# Patient Record
Sex: MALE | Race: WHITE | URBAN - METROPOLITAN AREA
[De-identification: names, ages, dates, MRNs, and addresses within clinical notes are randomized per-mention and may not be internally consistent; named-entity substitution may affect disease eponyms.]

---

## 2017-03-01 ENCOUNTER — HISTORICAL (OUTPATIENT)
Dept: ADMINISTRATIVE | Facility: HOSPITAL | Age: 57
End: 2017-03-01

## 2017-03-09 ENCOUNTER — HISTORICAL (OUTPATIENT)
Dept: ADMINISTRATIVE | Facility: HOSPITAL | Age: 57
End: 2017-03-09

## 2017-03-22 ENCOUNTER — HISTORICAL (OUTPATIENT)
Dept: ADMINISTRATIVE | Facility: HOSPITAL | Age: 57
End: 2017-03-22

## 2017-05-23 ENCOUNTER — HISTORICAL (OUTPATIENT)
Dept: ADMINISTRATIVE | Facility: HOSPITAL | Age: 57
End: 2017-05-23

## 2017-05-23 LAB
ABS NEUT (OLG): 2.92 X10(3)/MCL (ref 2.1–9.2)
ALBUMIN SERPL-MCNC: 3.6 GM/DL (ref 3.4–5)
ALBUMIN/GLOB SERPL: 1 {RATIO}
ALP SERPL-CCNC: 159 UNIT/L (ref 20–120)
ALT SERPL-CCNC: 33 UNIT/L
AST SERPL-CCNC: 45 UNIT/L
BASOPHILS # BLD AUTO: 0.02 X10(3)/MCL
BASOPHILS NFR BLD AUTO: 0 % (ref 0–1)
BILIRUB SERPL-MCNC: 1 MG/DL
BILIRUBIN DIRECT+TOT PNL SERPL-MCNC: 0.2 MG/DL
BILIRUBIN DIRECT+TOT PNL SERPL-MCNC: 0.8 MG/DL
BUN SERPL-MCNC: 13 MG/DL (ref 7–25)
CALCIUM SERPL-MCNC: 9.3 MG/DL (ref 8.4–10.3)
CHLORIDE SERPL-SCNC: 103 MMOL/L (ref 96–110)
CO2 SERPL-SCNC: 28 MMOL/L (ref 24–32)
CREAT SERPL-MCNC: 0.86 MG/DL (ref 0.7–1.4)
EOSINOPHIL # BLD AUTO: 0.06 10*3/UL
EOSINOPHIL NFR BLD AUTO: 2 % (ref 0–5)
ERYTHROCYTE [DISTWIDTH] IN BLOOD BY AUTOMATED COUNT: 13.9 % (ref 11.5–14.5)
GLOBULIN SER-MCNC: 4.1 GM/ML (ref 2.3–3.5)
GLUCOSE SERPL-MCNC: 81 MG/DL (ref 65–99)
HCT VFR BLD AUTO: 35.5 % (ref 40–51)
HGB BLD-MCNC: 12.1 GM/DL (ref 13.5–17.5)
IMM GRANULOCYTES # BLD AUTO: 0.01 10*3/UL
IMM GRANULOCYTES NFR BLD AUTO: 0 %
LYMPHOCYTES # BLD AUTO: 0.31 X10(3)/MCL
LYMPHOCYTES NFR BLD AUTO: 8 % (ref 15–40)
MCH RBC QN AUTO: 32.6 PG (ref 26–34)
MCHC RBC AUTO-ENTMCNC: 34.1 GM/DL (ref 31–37)
MCV RBC AUTO: 95.7 FL (ref 80–100)
MONOCYTES # BLD AUTO: 0.54 X10(3)/MCL
MONOCYTES NFR BLD AUTO: 14 % (ref 4–12)
NEUTROPHILS # BLD AUTO: 2.92 X10(3)/MCL
NEUTROPHILS NFR BLD AUTO: 76 X10(3)/MCL
PLATELET # BLD AUTO: 48 X10(3)/MCL (ref 130–400)
PMV BLD AUTO: 10.2 FL (ref 7.4–10.4)
POTASSIUM SERPL-SCNC: 4.3 MMOL/L (ref 3.6–5.2)
PROT SERPL-MCNC: 7.7 GM/DL (ref 6–8)
RBC # BLD AUTO: 3.71 X10(6)/MCL (ref 4.5–5.9)
SODIUM SERPL-SCNC: 136 MMOL/L (ref 135–146)
WBC # SPEC AUTO: 3.9 X10(3)/MCL (ref 4.5–11)

## 2017-05-30 ENCOUNTER — HISTORICAL (OUTPATIENT)
Dept: ADMINISTRATIVE | Facility: HOSPITAL | Age: 57
End: 2017-05-30

## 2017-05-30 LAB
ABS NEUT (OLG): 2.81 X10(3)/MCL (ref 2.1–9.2)
ALBUMIN SERPL-MCNC: 3.4 GM/DL (ref 3.4–5)
ALBUMIN/GLOB SERPL: 1 {RATIO}
ALP SERPL-CCNC: 158 UNIT/L (ref 20–120)
ALT SERPL-CCNC: 29 UNIT/L
AST SERPL-CCNC: 32 UNIT/L
BASOPHILS # BLD AUTO: 0.02 X10(3)/MCL
BASOPHILS NFR BLD AUTO: 0 % (ref 0–1)
BILIRUB SERPL-MCNC: 0.8 MG/DL
BILIRUBIN DIRECT+TOT PNL SERPL-MCNC: 0.1 MG/DL
BILIRUBIN DIRECT+TOT PNL SERPL-MCNC: 0.7 MG/DL
BUN SERPL-MCNC: 16 MG/DL (ref 7–25)
CALCIUM SERPL-MCNC: 9 MG/DL (ref 8.4–10.3)
CHLORIDE SERPL-SCNC: 104 MMOL/L (ref 96–110)
CO2 SERPL-SCNC: 27 MMOL/L (ref 24–32)
CREAT SERPL-MCNC: 0.87 MG/DL (ref 0.7–1.4)
EOSINOPHIL # BLD AUTO: 0.07 X10(3)/MCL
EOSINOPHIL NFR BLD AUTO: 2 % (ref 0–5)
ERYTHROCYTE [DISTWIDTH] IN BLOOD BY AUTOMATED COUNT: 13.8 % (ref 11.5–14.5)
GLOBULIN SER-MCNC: 4.3 GM/ML (ref 2.3–3.5)
GLUCOSE SERPL-MCNC: 140 MG/DL (ref 65–99)
HCT VFR BLD AUTO: 34.9 % (ref 40–51)
HGB BLD-MCNC: 11.7 GM/DL (ref 13.5–17.5)
IMM GRANULOCYTES # BLD AUTO: 0.01 10*3/UL
IMM GRANULOCYTES NFR BLD AUTO: 0 %
LYMPHOCYTES # BLD AUTO: 0.24 X10(3)/MCL
LYMPHOCYTES NFR BLD AUTO: 6 % (ref 15–40)
MCH RBC QN AUTO: 31.7 PG (ref 26–34)
MCHC RBC AUTO-ENTMCNC: 33.5 GM/DL (ref 31–37)
MCV RBC AUTO: 94.6 FL (ref 80–100)
MONOCYTES # BLD AUTO: 0.52 X10(3)/MCL
MONOCYTES NFR BLD AUTO: 14 % (ref 4–12)
NEUTROPHILS # BLD AUTO: 2.81 X10(3)/MCL
NEUTROPHILS NFR BLD AUTO: 77 X10(3)/MCL
PLATELET # BLD AUTO: 55 X10(3)/MCL (ref 130–400)
PMV BLD AUTO: 10.5 FL (ref 7.4–10.4)
POTASSIUM SERPL-SCNC: 4.3 MMOL/L (ref 3.6–5.2)
PROT SERPL-MCNC: 7.7 GM/DL (ref 6–8)
RBC # BLD AUTO: 3.69 X10(6)/MCL (ref 4.5–5.9)
SODIUM SERPL-SCNC: 135 MMOL/L (ref 135–146)
WBC # SPEC AUTO: 3.7 X10(3)/MCL (ref 4.5–11)

## 2017-06-06 ENCOUNTER — HISTORICAL (OUTPATIENT)
Dept: ADMINISTRATIVE | Facility: HOSPITAL | Age: 57
End: 2017-06-06

## 2017-06-06 LAB
ABS NEUT (OLG): 2.05 X10(3)/MCL (ref 2.1–9.2)
ALBUMIN SERPL-MCNC: 3.2 GM/DL (ref 3.4–5)
ALBUMIN/GLOB SERPL: 1 {RATIO}
ALP SERPL-CCNC: 163 UNIT/L (ref 20–120)
ALT SERPL-CCNC: 39 UNIT/L
AST SERPL-CCNC: 61 UNIT/L
BASOPHILS # BLD AUTO: 0.01 X10(3)/MCL
BASOPHILS NFR BLD AUTO: 0 % (ref 0–1)
BILIRUB SERPL-MCNC: 0.6 MG/DL
BILIRUBIN DIRECT+TOT PNL SERPL-MCNC: 0.1 MG/DL
BILIRUBIN DIRECT+TOT PNL SERPL-MCNC: 0.5 MG/DL
BUN SERPL-MCNC: 19 MG/DL (ref 7–25)
CALCIUM SERPL-MCNC: 9.1 MG/DL (ref 8.4–10.3)
CHLORIDE SERPL-SCNC: 104 MMOL/L (ref 96–110)
CO2 SERPL-SCNC: 27 MMOL/L (ref 24–32)
CREAT SERPL-MCNC: 1 MG/DL (ref 0.7–1.4)
EOSINOPHIL # BLD AUTO: 0.06 10*3/UL
EOSINOPHIL NFR BLD AUTO: 2 % (ref 0–5)
ERYTHROCYTE [DISTWIDTH] IN BLOOD BY AUTOMATED COUNT: 13.6 % (ref 11.5–14.5)
GLOBULIN SER-MCNC: 4.4 GM/ML (ref 2.3–3.5)
GLUCOSE SERPL-MCNC: 162 MG/DL (ref 65–99)
HCT VFR BLD AUTO: 34.3 % (ref 40–51)
HGB BLD-MCNC: 11.6 GM/DL (ref 13.5–17.5)
IMM GRANULOCYTES # BLD AUTO: 0.01 10*3/UL
IMM GRANULOCYTES NFR BLD AUTO: 0 %
LYMPHOCYTES # BLD AUTO: 0.18 X10(3)/MCL
LYMPHOCYTES NFR BLD AUTO: 7 % (ref 15–40)
MCH RBC QN AUTO: 32.1 PG (ref 26–34)
MCHC RBC AUTO-ENTMCNC: 33.8 GM/DL (ref 31–37)
MCV RBC AUTO: 95 FL (ref 80–100)
MONOCYTES # BLD AUTO: 0.32 X10(3)/MCL
MONOCYTES NFR BLD AUTO: 12 % (ref 4–12)
NEUTROPHILS # BLD AUTO: 2.05 X10(3)/MCL
NEUTROPHILS NFR BLD AUTO: 78 X10(3)/MCL
PLATELET # BLD AUTO: 53 X10(3)/MCL (ref 130–400)
PMV BLD AUTO: 10.6 FL (ref 7.4–10.4)
POTASSIUM SERPL-SCNC: 4.2 MMOL/L (ref 3.6–5.2)
PROT SERPL-MCNC: 7.6 GM/DL (ref 6–8)
RBC # BLD AUTO: 3.61 X10(6)/MCL (ref 4.5–5.9)
SODIUM SERPL-SCNC: 135 MMOL/L (ref 135–146)
WBC # SPEC AUTO: 2.6 X10(3)/MCL (ref 4.5–11)

## 2017-06-13 ENCOUNTER — HISTORICAL (OUTPATIENT)
Dept: ADMINISTRATIVE | Facility: HOSPITAL | Age: 57
End: 2017-06-13

## 2017-06-13 LAB
ABS NEUT (OLG): 1.64 X10(3)/MCL (ref 2.1–9.2)
ALBUMIN SERPL-MCNC: 3.2 GM/DL (ref 3.4–5)
ALBUMIN/GLOB SERPL: 1 {RATIO}
ALP SERPL-CCNC: 191 UNIT/L (ref 20–120)
ALT SERPL-CCNC: 62 UNIT/L
AST SERPL-CCNC: 68 UNIT/L
BASOPHILS NFR BLD MANUAL: 0 %
BILIRUB SERPL-MCNC: 0.8 MG/DL
BILIRUBIN DIRECT+TOT PNL SERPL-MCNC: 0.2 MG/DL
BILIRUBIN DIRECT+TOT PNL SERPL-MCNC: 0.6 MG/DL
BUN SERPL-MCNC: 12 MG/DL (ref 7–25)
CALCIUM SERPL-MCNC: 8.9 MG/DL (ref 8.4–10.3)
CHLORIDE SERPL-SCNC: 104 MMOL/L (ref 96–110)
CO2 SERPL-SCNC: 26 MMOL/L (ref 24–32)
CREAT SERPL-MCNC: 0.73 MG/DL (ref 0.7–1.4)
EOSINOPHIL NFR BLD MANUAL: 0 %
ERYTHROCYTE [DISTWIDTH] IN BLOOD BY AUTOMATED COUNT: 13.9 % (ref 11.5–14.5)
GLOBULIN SER-MCNC: 3.9 GM/ML (ref 2.3–3.5)
GLUCOSE SERPL-MCNC: 196 MG/DL (ref 65–99)
GRANULOCYTES NFR BLD MANUAL: 72 %
HCT VFR BLD AUTO: 31.8 % (ref 40–51)
HGB BLD-MCNC: 10.8 GM/DL (ref 13.5–17.5)
LYMPHOCYTES NFR BLD MANUAL: 6 %
MCH RBC QN AUTO: 32.3 PG (ref 26–34)
MCHC RBC AUTO-ENTMCNC: 34 GM/DL (ref 31–37)
MCV RBC AUTO: 95.2 FL (ref 80–100)
MONOCYTES NFR BLD MANUAL: 12 %
NEUTS BAND NFR BLD MANUAL: 10 %
PLATELET # BLD AUTO: 55 X10(3)/MCL (ref 130–400)
PLATELET # BLD EST: NORMAL 10*3/UL
PMV BLD AUTO: 11.2 FL (ref 7.4–10.4)
POTASSIUM SERPL-SCNC: 4.2 MMOL/L (ref 3.6–5.2)
PROT SERPL-MCNC: 7.1 GM/DL (ref 6–8)
RBC # BLD AUTO: 3.34 X10(6)/MCL (ref 4.5–5.9)
RBC MORPH BLD: NORMAL
SODIUM SERPL-SCNC: 135 MMOL/L (ref 135–146)
WBC # SPEC AUTO: 2.2 X10(3)/MCL (ref 4.5–11)

## 2017-06-27 ENCOUNTER — HISTORICAL (OUTPATIENT)
Dept: ADMINISTRATIVE | Facility: HOSPITAL | Age: 57
End: 2017-06-27

## 2017-06-27 LAB
ABS NEUT (OLG): 1.62 X10(3)/MCL
ACANTHOCYTES (OLG): ABNORMAL
ALBUMIN SERPL-MCNC: 3.6 GM/DL (ref 3.4–5)
ALBUMIN/GLOB SERPL: 1 {RATIO}
ALP SERPL-CCNC: 200 UNIT/L (ref 20–120)
ALT SERPL-CCNC: 51 UNIT/L
ANISOCYTOSIS BLD QL SMEAR: ABNORMAL
AST SERPL-CCNC: 55 UNIT/L
BASOPHILS NFR BLD MANUAL: 0 %
BILIRUB SERPL-MCNC: 0.9 MG/DL
BILIRUBIN DIRECT+TOT PNL SERPL-MCNC: 0.2 MG/DL
BILIRUBIN DIRECT+TOT PNL SERPL-MCNC: 0.7 MG/DL
BUN SERPL-MCNC: 12 MG/DL (ref 7–25)
CALCIUM SERPL-MCNC: 9.2 MG/DL (ref 8.4–10.3)
CHLORIDE SERPL-SCNC: 104 MMOL/L (ref 96–110)
CO2 SERPL-SCNC: 27 MMOL/L (ref 24–32)
CREAT SERPL-MCNC: 0.7 MG/DL (ref 0.7–1.4)
EOSINOPHIL NFR BLD MANUAL: 0 %
ERYTHROCYTE [DISTWIDTH] IN BLOOD BY AUTOMATED COUNT: 14.9 % (ref 11.5–14.5)
GLOBULIN SER-MCNC: 4 GM/ML (ref 2.3–3.5)
GLUCOSE SERPL-MCNC: 108 MG/DL (ref 65–99)
GRANULOCYTES NFR BLD MANUAL: 73 %
HCT VFR BLD AUTO: 32.3 % (ref 40–51)
HGB BLD-MCNC: 10.7 GM/DL (ref 13.5–17.5)
HYPOCHROMIA BLD QL SMEAR: ABNORMAL
LYMPHOCYTES NFR BLD MANUAL: 1 %
LYMPHOCYTES NFR BLD MANUAL: 18 %
MACROCYTES BLD QL SMEAR: ABNORMAL
MCH RBC QN AUTO: 30.7 PG (ref 26–34)
MCHC RBC AUTO-ENTMCNC: 33.1 GM/DL (ref 31–37)
MCV RBC AUTO: 92.8 FL (ref 80–100)
MICROCYTES BLD QL SMEAR: ABNORMAL
MONOCYTES NFR BLD MANUAL: 3 %
NEUTS BAND NFR BLD MANUAL: 5 %
PLATELET # BLD AUTO: 66 X10(3)/MCL (ref 130–400)
PLATELET # BLD EST: ABNORMAL 10*3/UL
PMV BLD AUTO: 11 FL (ref 7.4–10.4)
POTASSIUM SERPL-SCNC: 4.3 MMOL/L (ref 3.6–5.2)
PROT SERPL-MCNC: 7.6 GM/DL (ref 6–8)
RBC # BLD AUTO: 3.48 X10(6)/MCL (ref 4.5–5.9)
RBC MORPH BLD: ABNORMAL
SODIUM SERPL-SCNC: 135 MMOL/L (ref 135–146)
TSH SERPL-ACNC: 0.85 MIU/L (ref 0.5–5)
WBC # SPEC AUTO: 2.5 X10(3)/MCL (ref 4.5–11)

## 2017-07-04 ENCOUNTER — HISTORICAL (OUTPATIENT)
Dept: ADMINISTRATIVE | Facility: HOSPITAL | Age: 57
End: 2017-07-04

## 2017-07-04 LAB
ABS NEUT (OLG): 2.92 X10(3)/MCL (ref 2.1–9.2)
ALBUMIN SERPL-MCNC: 3.1 GM/DL (ref 3.4–5)
ALBUMIN/GLOB SERPL: 1 RATIO (ref 1–2)
ALP SERPL-CCNC: 243 UNIT/L (ref 45–117)
ALT SERPL-CCNC: 61 UNIT/L (ref 12–78)
AST SERPL-CCNC: 61 UNIT/L (ref 15–37)
BASOPHILS NFR BLD MANUAL: 0 %
BILIRUB SERPL-MCNC: 0.8 MG/DL (ref 0.2–1)
BILIRUBIN DIRECT+TOT PNL SERPL-MCNC: 0.3 MG/DL
BILIRUBIN DIRECT+TOT PNL SERPL-MCNC: 0.5 MG/DL
BUN SERPL-MCNC: 14 MG/DL (ref 7–18)
CALCIUM SERPL-MCNC: 10 MG/DL (ref 8.5–10.1)
CHLORIDE SERPL-SCNC: 105 MMOL/L (ref 98–107)
CO2 SERPL-SCNC: 28 MMOL/L (ref 21–32)
CREAT SERPL-MCNC: 0.8 MG/DL (ref 0.6–1.3)
EOSINOPHIL NFR BLD MANUAL: 1 %
ERYTHROCYTE [DISTWIDTH] IN BLOOD BY AUTOMATED COUNT: 15 % (ref 11.5–14.5)
GLOBULIN SER-MCNC: 4.7 GM/ML (ref 2.3–3.5)
GLUCOSE SERPL-MCNC: 109 MG/DL (ref 74–106)
GRANULOCYTES NFR BLD MANUAL: 75 %
HCT VFR BLD AUTO: 32.7 % (ref 40–51)
HGB BLD-MCNC: 10.9 GM/DL (ref 13.5–17.5)
LYMPHOCYTES NFR BLD MANUAL: 8 %
MCH RBC QN AUTO: 30.4 PG (ref 26–34)
MCHC RBC AUTO-ENTMCNC: 33.3 GM/DL (ref 31–37)
MCV RBC AUTO: 91.3 FL (ref 80–100)
MONOCYTES NFR BLD MANUAL: 7 %
NEUTS BAND NFR BLD MANUAL: 9 %
PLATELET # BLD AUTO: 68 X10(3)/MCL (ref 130–400)
PLATELET # BLD EST: NORMAL 10*3/UL
PMV BLD AUTO: 10.6 FL (ref 7.4–10.4)
POTASSIUM SERPL-SCNC: 4.4 MMOL/L (ref 3.5–5.1)
PROT SERPL-MCNC: 7.8 GM/DL (ref 6.4–8.2)
RBC # BLD AUTO: 3.58 X10(6)/MCL (ref 4.5–5.9)
RBC MORPH BLD: NORMAL
SODIUM SERPL-SCNC: 139 MMOL/L (ref 136–145)
WBC # SPEC AUTO: 3.7 X10(3)/MCL (ref 4.5–11)

## 2017-07-11 ENCOUNTER — HISTORICAL (OUTPATIENT)
Dept: ADMINISTRATIVE | Facility: HOSPITAL | Age: 57
End: 2017-07-11

## 2017-07-11 LAB
ALBUMIN SERPL-MCNC: 3 GM/DL (ref 3.4–5)
ALBUMIN/GLOB SERPL: 1 RATIO (ref 1–2)
ALP SERPL-CCNC: 261 UNIT/L (ref 45–117)
ALT SERPL-CCNC: 58 UNIT/L (ref 12–78)
AST SERPL-CCNC: 64 UNIT/L (ref 15–37)
BILIRUB SERPL-MCNC: 0.6 MG/DL (ref 0.2–1)
BILIRUBIN DIRECT+TOT PNL SERPL-MCNC: 0.2 MG/DL
BILIRUBIN DIRECT+TOT PNL SERPL-MCNC: 0.4 MG/DL
BUN SERPL-MCNC: 13 MG/DL (ref 7–18)
CALCIUM SERPL-MCNC: 10 MG/DL (ref 8.5–10.1)
CHLORIDE SERPL-SCNC: 104 MMOL/L (ref 98–107)
CO2 SERPL-SCNC: 30 MMOL/L (ref 21–32)
CREAT SERPL-MCNC: 0.9 MG/DL (ref 0.6–1.3)
GLOBULIN SER-MCNC: 4.5 GM/ML (ref 2.3–3.5)
GLUCOSE SERPL-MCNC: 167 MG/DL (ref 74–106)
POTASSIUM SERPL-SCNC: 4.2 MMOL/L (ref 3.5–5.1)
PROT SERPL-MCNC: 7.5 GM/DL (ref 6.4–8.2)
SODIUM SERPL-SCNC: 140 MMOL/L (ref 136–145)

## 2017-07-12 LAB
ABS NEUT (OLG): 2.23 X10(3)/MCL (ref 2.1–9.2)
BASOPHILS # BLD AUTO: 0.02 X10(3)/MCL
BASOPHILS NFR BLD AUTO: 1 % (ref 0–1)
EOSINOPHIL # BLD AUTO: 0.04 X10(3)/MCL
EOSINOPHIL NFR BLD AUTO: 1 % (ref 0–5)
ERYTHROCYTE [DISTWIDTH] IN BLOOD BY AUTOMATED COUNT: 15.1 % (ref 11.5–14.5)
HCT VFR BLD AUTO: 31 % (ref 40–51)
HGB BLD-MCNC: 10.5 GM/DL (ref 13.5–17.5)
IMM GRANULOCYTES # BLD AUTO: 0.02 10*3/UL
IMM GRANULOCYTES NFR BLD AUTO: 1 %
LYMPHOCYTES # BLD AUTO: 0.21 X10(3)/MCL
LYMPHOCYTES NFR BLD AUTO: 7 % (ref 15–40)
MCH RBC QN AUTO: 30.6 PG (ref 26–34)
MCHC RBC AUTO-ENTMCNC: 33.9 GM/DL (ref 31–37)
MCV RBC AUTO: 90.4 FL (ref 80–100)
MONOCYTES # BLD AUTO: 0.4 X10(3)/MCL
MONOCYTES NFR BLD AUTO: 14 % (ref 4–12)
NEUTROPHILS # BLD AUTO: 2.23 X10(3)/MCL
NEUTROPHILS NFR BLD AUTO: 76 X10(3)/MCL
PLATELET # BLD AUTO: 76 X10(3)/MCL (ref 130–400)
PMV BLD AUTO: 12.1 FL (ref 7.4–10.4)
RBC # BLD AUTO: 3.43 X10(6)/MCL (ref 4.5–5.9)
WBC # SPEC AUTO: 2.9 X10(3)/MCL (ref 4.5–11)

## 2017-07-16 ENCOUNTER — HISTORICAL (OUTPATIENT)
Dept: ADMINISTRATIVE | Facility: HOSPITAL | Age: 57
End: 2017-07-16

## 2017-07-16 LAB
ALBUMIN SERPL-MCNC: 2.9 GM/DL (ref 3.4–5)
ALBUMIN/GLOB SERPL: 1 RATIO (ref 1–2)
ALP SERPL-CCNC: 228 UNIT/L (ref 45–117)
ALT SERPL-CCNC: 39 UNIT/L (ref 12–78)
ANISOCYTOSIS BLD QL SMEAR: ABNORMAL
APPEARANCE, UA: CLEAR
AST SERPL-CCNC: 34 UNIT/L (ref 15–37)
BACTERIA #/AREA URNS AUTO: ABNORMAL /[HPF]
BASOPHILS NFR BLD MANUAL: 0 %
BILIRUB SERPL-MCNC: 0.7 MG/DL (ref 0.2–1)
BILIRUB UR QL STRIP: NEGATIVE
BILIRUBIN DIRECT+TOT PNL SERPL-MCNC: 0.3 MG/DL
BILIRUBIN DIRECT+TOT PNL SERPL-MCNC: 0.4 MG/DL
BUN SERPL-MCNC: 12 MG/DL (ref 7–18)
CALCIUM SERPL-MCNC: 10 MG/DL (ref 8.5–10.1)
CHLORIDE SERPL-SCNC: 104 MMOL/L (ref 98–107)
CK MB SERPL-MCNC: 1.3 NG/ML (ref 1–3.6)
CK SERPL-CCNC: 36 UNIT/L (ref 39–308)
CO2 SERPL-SCNC: 26 MMOL/L (ref 21–32)
COLOR UR: ABNORMAL
CREAT SERPL-MCNC: 0.7 MG/DL (ref 0.6–1.3)
EOSINOPHIL NFR BLD MANUAL: 1 %
ERYTHROCYTE [DISTWIDTH] IN BLOOD BY AUTOMATED COUNT: 16 % (ref 11.5–14.5)
GLOBULIN SER-MCNC: 4.5 GM/ML (ref 2.3–3.5)
GLUCOSE (UA): NORMAL
GLUCOSE SERPL-MCNC: 94 MG/DL (ref 74–106)
GRANULOCYTES NFR BLD MANUAL: 68 % (ref 43–75)
HCT VFR BLD AUTO: 28.9 % (ref 40–51)
HGB BLD-MCNC: 9.6 GM/DL (ref 13.5–17.5)
HGB UR QL STRIP: NEGATIVE
HYALINE CASTS #/AREA URNS LPF: ABNORMAL /[LPF]
HYPOCHROMIA BLD QL SMEAR: ABNORMAL
KETONES UR QL STRIP: NEGATIVE
LEUKOCYTE ESTERASE UR QL STRIP: NEGATIVE
LYMPHOCYTES NFR BLD MANUAL: 5 % (ref 20.5–51.1)
MCH RBC QN AUTO: 30.6 PG (ref 26–34)
MCHC RBC AUTO-ENTMCNC: 33.2 GM/DL (ref 31–37)
MCV RBC AUTO: 92 FL (ref 80–100)
MONOCYTES NFR BLD MANUAL: 5 % (ref 2–9)
MYELOCYTES NFR BLD MANUAL: 1 %
NEUTS BAND NFR BLD MANUAL: 20 % (ref 0–10)
NITRITE UR QL STRIP: NEGATIVE
PH UR STRIP: 6 [PH] (ref 4.5–8)
PLATELET # BLD AUTO: 64 X10(3)/MCL (ref 130–400)
PLATELET # BLD EST: ABNORMAL 10*3/UL
PMV BLD AUTO: 11.7 FL (ref 7.4–10.4)
POC TROPONIN: 0 NG/ML (ref 0–0.08)
POLYCHROMASIA BLD QL SMEAR: ABNORMAL
POTASSIUM SERPL-SCNC: 4.4 MMOL/L (ref 3.5–5.1)
PROT SERPL-MCNC: 7.4 GM/DL (ref 6.4–8.2)
PROT UR QL STRIP: NEGATIVE
RBC # BLD AUTO: 3.14 X10(6)/MCL (ref 4.5–5.9)
RBC #/AREA URNS AUTO: ABNORMAL /[HPF]
RBC MORPH BLD: ABNORMAL
SODIUM SERPL-SCNC: 137 MMOL/L (ref 136–145)
SP GR UR STRIP: 1 (ref 1–1.03)
SQUAMOUS #/AREA URNS LPF: ABNORMAL /[LPF]
UROBILINOGEN UR STRIP-ACNC: NORMAL
WBC # SPEC AUTO: 8.1 X10(3)/MCL (ref 4.5–11)
WBC #/AREA URNS AUTO: ABNORMAL /HPF

## 2017-07-24 ENCOUNTER — HISTORICAL (OUTPATIENT)
Dept: ADMINISTRATIVE | Facility: HOSPITAL | Age: 57
End: 2017-07-24

## 2017-07-24 LAB
ABS NEUT (OLG): 7.79 X10(3)/MCL (ref 2.1–9.2)
BASOPHILS # BLD AUTO: 0.02 X10(3)/MCL
BASOPHILS NFR BLD AUTO: 0 % (ref 0–1)
BUN SERPL-MCNC: 16 MG/DL (ref 7–18)
CALCIUM SERPL-MCNC: 10.6 MG/DL (ref 8.5–10.1)
CHLORIDE SERPL-SCNC: 104 MMOL/L (ref 98–107)
CO2 SERPL-SCNC: 30 MMOL/L (ref 21–32)
CREAT SERPL-MCNC: 0.8 MG/DL (ref 0.6–1.3)
EOSINOPHIL # BLD AUTO: 0.02 10*3/UL
EOSINOPHIL NFR BLD AUTO: 0 % (ref 0–5)
ERYTHROCYTE [DISTWIDTH] IN BLOOD BY AUTOMATED COUNT: 16.7 % (ref 11.5–14.5)
GLUCOSE SERPL-MCNC: 132 MG/DL (ref 74–106)
HCT VFR BLD AUTO: 28.4 % (ref 40–51)
HGB BLD-MCNC: 9.3 GM/DL (ref 13.5–17.5)
IMM GRANULOCYTES # BLD AUTO: 0.05 10*3/UL
IMM GRANULOCYTES NFR BLD AUTO: 1 %
LYMPHOCYTES # BLD AUTO: 0.26 X10(3)/MCL
LYMPHOCYTES NFR BLD AUTO: 3 % (ref 15–40)
MCH RBC QN AUTO: 30.2 PG (ref 26–34)
MCHC RBC AUTO-ENTMCNC: 32.7 GM/DL (ref 31–37)
MCV RBC AUTO: 92.2 FL (ref 80–100)
MONOCYTES # BLD AUTO: 0.64 X10(3)/MCL
MONOCYTES NFR BLD AUTO: 7 % (ref 4–12)
NEUTROPHILS # BLD AUTO: 7.79 X10(3)/MCL
NEUTROPHILS NFR BLD AUTO: 89 X10(3)/MCL
PLATELET # BLD AUTO: 53 X10(3)/MCL (ref 130–400)
PMV BLD AUTO: 10.3 FL (ref 7.4–10.4)
POTASSIUM SERPL-SCNC: 4 MMOL/L (ref 3.5–5.1)
RBC # BLD AUTO: 3.08 X10(6)/MCL (ref 4.5–5.9)
SODIUM SERPL-SCNC: 137 MMOL/L (ref 136–145)
WBC # SPEC AUTO: 8.8 X10(3)/MCL (ref 4.5–11)

## 2017-07-31 ENCOUNTER — HISTORICAL (OUTPATIENT)
Dept: ADMINISTRATIVE | Facility: HOSPITAL | Age: 57
End: 2017-07-31

## 2017-07-31 LAB
ABS NEUT (OLG): 2.86 X10(3)/MCL (ref 2.1–9.2)
ALBUMIN SERPL-MCNC: 2.5 GM/DL (ref 3.4–5)
ALBUMIN/GLOB SERPL: 1 RATIO (ref 1–2)
ALP SERPL-CCNC: 209 UNIT/L (ref 45–117)
ALT SERPL-CCNC: 33 UNIT/L (ref 12–78)
ANISOCYTOSIS BLD QL SMEAR: ABNORMAL
AST SERPL-CCNC: 45 UNIT/L (ref 15–37)
BASOPHILS NFR BLD MANUAL: 2 %
BILIRUB SERPL-MCNC: 0.5 MG/DL (ref 0.2–1)
BILIRUBIN DIRECT+TOT PNL SERPL-MCNC: 0.2 MG/DL
BILIRUBIN DIRECT+TOT PNL SERPL-MCNC: 0.3 MG/DL
BUN SERPL-MCNC: 16 MG/DL (ref 7–18)
CALCIUM SERPL-MCNC: 9.2 MG/DL (ref 8.5–10.1)
CHLORIDE SERPL-SCNC: 108 MMOL/L (ref 98–107)
CO2 SERPL-SCNC: 24 MMOL/L (ref 21–32)
CREAT SERPL-MCNC: 0.7 MG/DL (ref 0.6–1.3)
EOSINOPHIL NFR BLD MANUAL: 6 %
ERYTHROCYTE [DISTWIDTH] IN BLOOD BY AUTOMATED COUNT: 17.2 % (ref 11.5–14.5)
GLOBULIN SER-MCNC: 4.2 GM/ML (ref 2.3–3.5)
GLUCOSE SERPL-MCNC: 181 MG/DL (ref 74–106)
GRANULOCYTES NFR BLD MANUAL: 34 % (ref 43–75)
HCT VFR BLD AUTO: 26.2 % (ref 40–51)
HGB BLD-MCNC: 8.3 GM/DL (ref 13.5–17.5)
HYPOCHROMIA BLD QL SMEAR: ABNORMAL
LYMPHOCYTES NFR BLD MANUAL: 4 % (ref 20.5–51.1)
MCH RBC QN AUTO: 29.5 PG (ref 26–34)
MCHC RBC AUTO-ENTMCNC: 31.7 GM/DL (ref 31–37)
MCV RBC AUTO: 93.2 FL (ref 80–100)
MONOCYTES NFR BLD MANUAL: 14 % (ref 2–9)
NEUTS BAND NFR BLD MANUAL: 40 % (ref 0–10)
PLATELET # BLD AUTO: 53 X10(3)/MCL (ref 130–400)
PLATELET # BLD EST: ABNORMAL 10*3/UL
PMV BLD AUTO: 11.5 FL (ref 7.4–10.4)
POLYCHROMASIA BLD QL SMEAR: ABNORMAL
POTASSIUM SERPL-SCNC: 4.3 MMOL/L (ref 3.5–5.1)
PROT SERPL-MCNC: 6.7 GM/DL (ref 6.4–8.2)
RBC # BLD AUTO: 2.81 X10(6)/MCL (ref 4.5–5.9)
RBC MORPH BLD: ABNORMAL
SODIUM SERPL-SCNC: 141 MMOL/L (ref 136–145)
WBC # SPEC AUTO: 3.5 X10(3)/MCL (ref 4.5–11)

## 2017-08-08 ENCOUNTER — HISTORICAL (OUTPATIENT)
Dept: ADMINISTRATIVE | Facility: HOSPITAL | Age: 57
End: 2017-08-08

## 2017-08-08 LAB
ABS NEUT (OLG): 2.28 X10(3)/MCL
ALBUMIN SERPL-MCNC: 2.6 GM/DL (ref 3.4–5)
ALBUMIN/GLOB SERPL: 1 RATIO (ref 1–2)
ALP SERPL-CCNC: 204 UNIT/L (ref 45–117)
ALT SERPL-CCNC: 27 UNIT/L (ref 12–78)
ANISOCYTOSIS BLD QL SMEAR: ABNORMAL
AST SERPL-CCNC: 28 UNIT/L (ref 15–37)
BASOPHILS NFR BLD MANUAL: 2 %
BILIRUB SERPL-MCNC: 0.4 MG/DL (ref 0.2–1)
BILIRUBIN DIRECT+TOT PNL SERPL-MCNC: 0.2 MG/DL
BILIRUBIN DIRECT+TOT PNL SERPL-MCNC: 0.2 MG/DL
BUN SERPL-MCNC: 13 MG/DL (ref 7–18)
CALCIUM SERPL-MCNC: 9.6 MG/DL (ref 8.5–10.1)
CHLORIDE SERPL-SCNC: 109 MMOL/L (ref 98–107)
CO2 SERPL-SCNC: 25 MMOL/L (ref 21–32)
CREAT SERPL-MCNC: 0.7 MG/DL (ref 0.6–1.3)
EOSINOPHIL NFR BLD MANUAL: 0 %
ERYTHROCYTE [DISTWIDTH] IN BLOOD BY AUTOMATED COUNT: 18 % (ref 11.5–14.5)
GLOBULIN SER-MCNC: 4.2 GM/ML (ref 2.3–3.5)
GLUCOSE SERPL-MCNC: 133 MG/DL (ref 74–106)
GRANULOCYTES NFR BLD MANUAL: 73 % (ref 43–75)
HCT VFR BLD AUTO: 25.5 % (ref 40–51)
HGB BLD-MCNC: 8.1 GM/DL (ref 13.5–17.5)
LYMPHOCYTES NFR BLD MANUAL: 1 %
LYMPHOCYTES NFR BLD MANUAL: 11 % (ref 20.5–51.1)
MCH RBC QN AUTO: 29.5 PG (ref 26–34)
MCHC RBC AUTO-ENTMCNC: 31.8 GM/DL (ref 31–37)
MCV RBC AUTO: 92.7 FL (ref 80–100)
MONOCYTES NFR BLD MANUAL: 7 % (ref 2–9)
MYELOCYTES NFR BLD MANUAL: 1 %
NEUTS BAND NFR BLD MANUAL: 5 % (ref 0–10)
PLATELET # BLD AUTO: 72 X10(3)/MCL (ref 130–400)
PLATELET # BLD EST: ABNORMAL 10*3/UL
PMV BLD AUTO: 11.3 FL (ref 7.4–10.4)
POIKILOCYTOSIS BLD QL SMEAR: ABNORMAL
POTASSIUM SERPL-SCNC: 4.3 MMOL/L (ref 3.5–5.1)
PROT SERPL-MCNC: 6.8 GM/DL (ref 6.4–8.2)
RBC # BLD AUTO: 2.75 X10(6)/MCL (ref 4.5–5.9)
RBC MORPH BLD: ABNORMAL
SODIUM SERPL-SCNC: 140 MMOL/L (ref 136–145)
SPHEROCYTES BLD QL SMEAR: ABNORMAL
WBC # SPEC AUTO: 3 X10(3)/MCL (ref 4.5–11)

## 2017-08-17 ENCOUNTER — HISTORICAL (OUTPATIENT)
Dept: ADMINISTRATIVE | Facility: HOSPITAL | Age: 57
End: 2017-08-17

## 2017-08-21 ENCOUNTER — HISTORICAL (OUTPATIENT)
Dept: ADMINISTRATIVE | Facility: HOSPITAL | Age: 57
End: 2017-08-21

## 2017-08-21 LAB
ABS NEUT (OLG): 6.89 X10(3)/MCL (ref 2.1–9.2)
ACANTHOCYTES (OLG): ABNORMAL
ANISOCYTOSIS BLD QL SMEAR: ABNORMAL
BASOPHILS # BLD AUTO: 0.02 X10(3)/MCL
BASOPHILS NFR BLD AUTO: 0 % (ref 0–1)
BASOPHILS NFR BLD MANUAL: 0 %
BUN SERPL-MCNC: 15 MG/DL (ref 7–18)
CALCIUM SERPL-MCNC: 10.3 MG/DL (ref 8.5–10.1)
CHLORIDE SERPL-SCNC: 109 MMOL/L (ref 98–107)
CO2 SERPL-SCNC: 28 MMOL/L (ref 21–32)
CREAT SERPL-MCNC: 0.7 MG/DL (ref 0.6–1.3)
EOSINOPHIL # BLD AUTO: 0.04 10*3/UL
EOSINOPHIL NFR BLD AUTO: 0 % (ref 0–5)
EOSINOPHIL NFR BLD MANUAL: 2 %
ERYTHROCYTE [DISTWIDTH] IN BLOOD BY AUTOMATED COUNT: 19.6 % (ref 11.5–14.5)
GLUCOSE SERPL-MCNC: 141 MG/DL (ref 74–106)
GRANULOCYTES NFR BLD MANUAL: 66 % (ref 43–75)
HCT VFR BLD AUTO: 24.1 % (ref 40–51)
HGB BLD-MCNC: 7.7 GM/DL (ref 13.5–17.5)
HYPOCHROMIA BLD QL SMEAR: ABNORMAL
IMM GRANULOCYTES # BLD AUTO: 0.05 10*3/UL
IMM GRANULOCYTES NFR BLD AUTO: 1 %
LYMPHOCYTES # BLD AUTO: 0.16 X10(3)/MCL
LYMPHOCYTES NFR BLD AUTO: 2 % (ref 15–40)
LYMPHOCYTES NFR BLD MANUAL: 0 % (ref 20.5–51.1)
MCH RBC QN AUTO: 30.2 PG (ref 26–34)
MCHC RBC AUTO-ENTMCNC: 32 GM/DL (ref 31–37)
MCV RBC AUTO: 94.5 FL (ref 80–100)
MONOCYTES # BLD AUTO: 0.56 X10(3)/MCL
MONOCYTES NFR BLD AUTO: 7 % (ref 4–12)
MONOCYTES NFR BLD MANUAL: 4 % (ref 2–9)
NEUTROPHILS # BLD AUTO: 6.89 X10(3)/MCL
NEUTROPHILS NFR BLD AUTO: 89 X10(3)/MCL
NEUTS BAND NFR BLD MANUAL: 28 % (ref 0–10)
PLATELET # BLD AUTO: 53 X10(3)/MCL (ref 130–400)
PLATELET # BLD EST: ABNORMAL 10*3/UL
PMV BLD AUTO: 11.9 FL (ref 7.4–10.4)
POLYCHROMASIA BLD QL SMEAR: ABNORMAL
POTASSIUM SERPL-SCNC: 4.2 MMOL/L (ref 3.5–5.1)
RBC # BLD AUTO: 2.55 X10(6)/MCL (ref 4.5–5.9)
RBC MORPH BLD: ABNORMAL
SODIUM SERPL-SCNC: 140 MMOL/L (ref 136–145)
WBC # SPEC AUTO: 7.7 X10(3)/MCL (ref 4.5–11)

## 2017-09-01 ENCOUNTER — HISTORICAL (OUTPATIENT)
Dept: ADMINISTRATIVE | Facility: HOSPITAL | Age: 57
End: 2017-09-01

## 2017-09-01 LAB
ABS NEUT (OLG): 3.75 X10(3)/MCL (ref 2.1–9.2)
ALBUMIN SERPL-MCNC: 2.4 GM/DL (ref 3.4–5)
ALBUMIN/GLOB SERPL: 1 RATIO (ref 1–2)
ALP SERPL-CCNC: 157 UNIT/L (ref 45–117)
ALT SERPL-CCNC: 15 UNIT/L (ref 12–78)
AST SERPL-CCNC: 22 UNIT/L (ref 15–37)
BASOPHILS # BLD AUTO: 0.02 X10(3)/MCL
BASOPHILS NFR BLD AUTO: 0 % (ref 0–1)
BILIRUB SERPL-MCNC: 0.6 MG/DL (ref 0.2–1)
BILIRUBIN DIRECT+TOT PNL SERPL-MCNC: 0.3 MG/DL
BILIRUBIN DIRECT+TOT PNL SERPL-MCNC: 0.3 MG/DL
BUN SERPL-MCNC: 8 MG/DL (ref 7–18)
CALCIUM SERPL-MCNC: 10.5 MG/DL (ref 8.5–10.1)
CHLORIDE SERPL-SCNC: 106 MMOL/L (ref 98–107)
CO2 SERPL-SCNC: 28 MMOL/L (ref 21–32)
CREAT SERPL-MCNC: 0.7 MG/DL (ref 0.6–1.3)
EOSINOPHIL # BLD AUTO: 0.1 X10(3)/MCL
EOSINOPHIL NFR BLD AUTO: 2 % (ref 0–5)
ERYTHROCYTE [DISTWIDTH] IN BLOOD BY AUTOMATED COUNT: 18.6 % (ref 11.5–14.5)
FERRITIN SERPL-MCNC: 173.6 NG/ML (ref 26–388)
FOLATE SERPL-MCNC: 5.9 NG/ML (ref 3.1–17.5)
GLOBULIN SER-MCNC: 4.5 GM/ML (ref 2.3–3.5)
GLUCOSE SERPL-MCNC: 111 MG/DL (ref 74–106)
HCT VFR BLD AUTO: 24.4 % (ref 40–51)
HGB BLD-MCNC: 7.5 GM/DL (ref 13.5–17.5)
IMM GRANULOCYTES # BLD AUTO: 0.01 10*3/UL
IMM GRANULOCYTES NFR BLD AUTO: 0 %
IRON SATN MFR SERPL: 6.3 % (ref 15–50)
IRON SERPL-MCNC: 15 MCG/DL (ref 65–175)
LYMPHOCYTES # BLD AUTO: 0.17 X10(3)/MCL
LYMPHOCYTES NFR BLD AUTO: 4 % (ref 15–40)
MCH RBC QN AUTO: 27.8 PG (ref 26–34)
MCHC RBC AUTO-ENTMCNC: 30.7 GM/DL (ref 31–37)
MCV RBC AUTO: 90.4 FL (ref 80–100)
MONOCYTES # BLD AUTO: 0.51 X10(3)/MCL
MONOCYTES NFR BLD AUTO: 11 % (ref 4–12)
NEUTROPHILS # BLD AUTO: 3.75 X10(3)/MCL
NEUTROPHILS NFR BLD AUTO: 82 X10(3)/MCL
PLATELET # BLD AUTO: 77 X10(3)/MCL (ref 130–400)
PMV BLD AUTO: 10.4 FL (ref 7.4–10.4)
POTASSIUM SERPL-SCNC: 4.2 MMOL/L (ref 3.5–5.1)
PROT SERPL-MCNC: 6.9 GM/DL (ref 6.4–8.2)
RBC # BLD AUTO: 2.7 X10(6)/MCL (ref 4.5–5.9)
RET# (OHS): 0.04 X10(6)/MCL (ref 0.02–0.09)
RETICULOCYTE COUNT AUTOMATED (OLG): 1.6 % (ref 0.5–1.5)
SODIUM SERPL-SCNC: 138 MMOL/L (ref 136–145)
TIBC SERPL-MCNC: 237 MCG/DL (ref 250–450)
TRANSFERRIN SERPL-MCNC: 182 MG/DL (ref 200–360)
VIT B12 SERPL-MCNC: 4387 PG/ML (ref 193–986)
WBC # SPEC AUTO: 4.6 X10(3)/MCL (ref 4.5–11)

## 2017-09-08 ENCOUNTER — HISTORICAL (OUTPATIENT)
Dept: ADMINISTRATIVE | Facility: HOSPITAL | Age: 57
End: 2017-09-08

## 2017-09-19 ENCOUNTER — HISTORICAL (OUTPATIENT)
Dept: ADMINISTRATIVE | Facility: HOSPITAL | Age: 57
End: 2017-09-19

## 2017-09-19 LAB
ABS NEUT (OLG): 5.45 X10(3)/MCL (ref 2.1–9.2)
ALBUMIN SERPL-MCNC: 2.4 GM/DL (ref 3.4–5)
ALBUMIN SERPL-MCNC: 2.6 GM/DL (ref 3.4–5)
ALBUMIN/GLOB SERPL: 1 RATIO (ref 1–2)
ALBUMIN/GLOB SERPL: 1 RATIO (ref 1–2)
ALP SERPL-CCNC: 129 UNIT/L (ref 45–117)
ALP SERPL-CCNC: 138 UNIT/L (ref 45–117)
ALT SERPL-CCNC: 12 UNIT/L (ref 12–78)
ALT SERPL-CCNC: 12 UNIT/L (ref 12–78)
AST SERPL-CCNC: 23 UNIT/L (ref 15–37)
AST SERPL-CCNC: 26 UNIT/L (ref 15–37)
BASOPHILS # BLD AUTO: 0.02 X10(3)/MCL
BASOPHILS NFR BLD AUTO: 0 % (ref 0–1)
BILIRUB SERPL-MCNC: 0.7 MG/DL (ref 0.2–1)
BILIRUB SERPL-MCNC: 1 MG/DL (ref 0.2–1)
BILIRUBIN DIRECT+TOT PNL SERPL-MCNC: 0.2 MG/DL
BILIRUBIN DIRECT+TOT PNL SERPL-MCNC: 0.4 MG/DL
BILIRUBIN DIRECT+TOT PNL SERPL-MCNC: 0.5 MG/DL
BILIRUBIN DIRECT+TOT PNL SERPL-MCNC: 0.6 MG/DL
BUN SERPL-MCNC: 10 MG/DL (ref 7–18)
BUN SERPL-MCNC: 12 MG/DL (ref 7–18)
CALCIUM SERPL-MCNC: 12.5 MG/DL (ref 8.5–10.1)
CALCIUM SERPL-MCNC: 14.8 MG/DL (ref 8.5–10.1)
CHLORIDE SERPL-SCNC: 100 MMOL/L (ref 98–107)
CHLORIDE SERPL-SCNC: 103 MMOL/L (ref 98–107)
CO2 SERPL-SCNC: 31 MMOL/L (ref 21–32)
CO2 SERPL-SCNC: 33 MMOL/L (ref 21–32)
CREAT SERPL-MCNC: 0.7 MG/DL (ref 0.6–1.3)
CREAT SERPL-MCNC: 0.8 MG/DL (ref 0.6–1.3)
DEPRECATED CALCIDIOL+CALCIFEROL SERPL-MC: 22.41 NG/ML (ref 30–80)
EOSINOPHIL # BLD AUTO: 0.09 X10(3)/MCL
EOSINOPHIL NFR BLD AUTO: 1 % (ref 0–5)
ERYTHROCYTE [DISTWIDTH] IN BLOOD BY AUTOMATED COUNT: 18.7 % (ref 11.5–14.5)
FERRITIN SERPL-MCNC: 762.4 NG/ML (ref 26–388)
GLOBULIN SER-MCNC: 4.5 GM/ML (ref 2.3–3.5)
GLOBULIN SER-MCNC: 4.8 GM/ML (ref 2.3–3.5)
GLUCOSE SERPL-MCNC: 111 MG/DL (ref 74–106)
GLUCOSE SERPL-MCNC: 116 MG/DL (ref 74–106)
HCT VFR BLD AUTO: 30.3 % (ref 40–51)
HGB BLD-MCNC: 9.3 GM/DL (ref 13.5–17.5)
IMM GRANULOCYTES # BLD AUTO: 0.02 10*3/UL
IMM GRANULOCYTES NFR BLD AUTO: 0 %
IRON SATN MFR SERPL: 10.5 % (ref 15–50)
IRON SERPL-MCNC: 19 MCG/DL (ref 65–175)
LYMPHOCYTES # BLD AUTO: 0.19 X10(3)/MCL
LYMPHOCYTES NFR BLD AUTO: 3 % (ref 15–40)
MAGNESIUM SERPL-MCNC: 1.9 MG/DL (ref 1.8–2.4)
MCH RBC QN AUTO: 26.5 PG (ref 26–34)
MCHC RBC AUTO-ENTMCNC: 30.7 GM/DL (ref 31–37)
MCV RBC AUTO: 86.3 FL (ref 80–100)
MONOCYTES # BLD AUTO: 0.76 X10(3)/MCL
MONOCYTES NFR BLD AUTO: 12 % (ref 4–12)
NEUTROPHILS # BLD AUTO: 5.45 X10(3)/MCL
NEUTROPHILS NFR BLD AUTO: 84 X10(3)/MCL
PHOSPHATE SERPL-MCNC: 1.6 MG/DL (ref 2.5–4.9)
PLATELET # BLD AUTO: 88 X10(3)/MCL (ref 130–400)
PMV BLD AUTO: 11.2 FL (ref 7.4–10.4)
POTASSIUM SERPL-SCNC: 2.9 MMOL/L (ref 3.5–5.1)
POTASSIUM SERPL-SCNC: 3.6 MMOL/L (ref 3.5–5.1)
PROT SERPL-MCNC: 6.9 GM/DL (ref 6.4–8.2)
PROT SERPL-MCNC: 7.4 GM/DL (ref 6.4–8.2)
PTH-INTACT SERPL-MCNC: <6.3 PG/ML (ref 13.8–85)
RBC # BLD AUTO: 3.51 X10(6)/MCL (ref 4.5–5.9)
SODIUM SERPL-SCNC: 138 MMOL/L (ref 136–145)
SODIUM SERPL-SCNC: 141 MMOL/L (ref 136–145)
TIBC SERPL-MCNC: 181 MCG/DL (ref 250–450)
TRANSFERRIN SERPL-MCNC: 142 MG/DL (ref 200–360)
TSH SERPL-ACNC: 1 MIU/L (ref 0.36–3.74)
WBC # SPEC AUTO: 6.5 X10(3)/MCL (ref 4.5–11)

## 2017-09-20 LAB
ALBUMIN SERPL-MCNC: 2 GM/DL (ref 3.4–5)
ALBUMIN SERPL-MCNC: 2.1 GM/DL (ref 3.4–5)
ALBUMIN/GLOB SERPL: 0 RATIO (ref 1–2)
ALBUMIN/GLOB SERPL: 0 RATIO (ref 1–2)
ALP SERPL-CCNC: 105 UNIT/L (ref 45–117)
ALP SERPL-CCNC: 109 UNIT/L (ref 45–117)
ALT SERPL-CCNC: 10 UNIT/L (ref 12–78)
ALT SERPL-CCNC: 11 UNIT/L (ref 12–78)
AST SERPL-CCNC: 18 UNIT/L (ref 15–37)
AST SERPL-CCNC: 21 UNIT/L (ref 15–37)
BILIRUB SERPL-MCNC: 0.6 MG/DL (ref 0.2–1)
BILIRUB SERPL-MCNC: 0.7 MG/DL (ref 0.2–1)
BILIRUBIN DIRECT+TOT PNL SERPL-MCNC: 0.2 MG/DL
BILIRUBIN DIRECT+TOT PNL SERPL-MCNC: 0.2 MG/DL
BILIRUBIN DIRECT+TOT PNL SERPL-MCNC: 0.4 MG/DL
BILIRUBIN DIRECT+TOT PNL SERPL-MCNC: 0.5 MG/DL
BUN SERPL-MCNC: 6 MG/DL (ref 7–18)
BUN SERPL-MCNC: 7 MG/DL (ref 7–18)
CALCIUM SERPL-MCNC: 10.1 MG/DL (ref 8.5–10.1)
CALCIUM SERPL-MCNC: 10.7 MG/DL (ref 8.5–10.1)
CHLORIDE SERPL-SCNC: 107 MMOL/L (ref 98–107)
CHLORIDE SERPL-SCNC: 107 MMOL/L (ref 98–107)
CO2 SERPL-SCNC: 27 MMOL/L (ref 21–32)
CO2 SERPL-SCNC: 27 MMOL/L (ref 21–32)
CREAT SERPL-MCNC: 0.5 MG/DL (ref 0.6–1.3)
CREAT SERPL-MCNC: 0.5 MG/DL (ref 0.6–1.3)
GLOBULIN SER-MCNC: 4 GM/ML (ref 2.3–3.5)
GLOBULIN SER-MCNC: 4.2 GM/ML (ref 2.3–3.5)
GLUCOSE SERPL-MCNC: 100 MG/DL (ref 74–106)
GLUCOSE SERPL-MCNC: 104 MG/DL (ref 74–106)
MAGNESIUM SERPL-MCNC: 1.7 MG/DL (ref 1.8–2.4)
PHOSPHATE SERPL-MCNC: 1.5 MG/DL (ref 2.5–4.9)
POTASSIUM SERPL-SCNC: 3.3 MMOL/L (ref 3.5–5.1)
POTASSIUM SERPL-SCNC: 3.6 MMOL/L (ref 3.5–5.1)
PROT SERPL-MCNC: 6 GM/DL (ref 6.4–8.2)
PROT SERPL-MCNC: 6.3 GM/DL (ref 6.4–8.2)
SODIUM SERPL-SCNC: 140 MMOL/L (ref 136–145)
SODIUM SERPL-SCNC: 142 MMOL/L (ref 136–145)

## 2017-09-29 ENCOUNTER — HISTORICAL (OUTPATIENT)
Dept: ADMINISTRATIVE | Facility: HOSPITAL | Age: 57
End: 2017-09-29

## 2017-09-29 LAB
ABS NEUT (OLG): 4.6 X10(3)/MCL (ref 2.1–9.2)
ALBUMIN SERPL-MCNC: 2.5 GM/DL (ref 3.4–5)
ALBUMIN/GLOB SERPL: 1 RATIO (ref 1–2)
ALP SERPL-CCNC: 192 UNIT/L (ref 45–117)
ALT SERPL-CCNC: 15 UNIT/L (ref 12–78)
AST SERPL-CCNC: 31 UNIT/L (ref 15–37)
BASOPHILS # BLD AUTO: 0.04 X10(3)/MCL
BASOPHILS NFR BLD AUTO: 1 % (ref 0–1)
BILIRUB SERPL-MCNC: 0.9 MG/DL (ref 0.2–1)
BILIRUBIN DIRECT+TOT PNL SERPL-MCNC: 0.4 MG/DL
BILIRUBIN DIRECT+TOT PNL SERPL-MCNC: 0.5 MG/DL
BUN SERPL-MCNC: 7 MG/DL (ref 7–18)
CALCIUM SERPL-MCNC: 12 MG/DL (ref 8.5–10.1)
CHLORIDE SERPL-SCNC: 100 MMOL/L (ref 98–107)
CO2 SERPL-SCNC: 31 MMOL/L (ref 21–32)
CREAT SERPL-MCNC: 0.5 MG/DL (ref 0.6–1.3)
EOSINOPHIL # BLD AUTO: 0.21 X10(3)/MCL
EOSINOPHIL NFR BLD AUTO: 4 % (ref 0–5)
ERYTHROCYTE [DISTWIDTH] IN BLOOD BY AUTOMATED COUNT: 18.8 % (ref 11.5–14.5)
GLOBULIN SER-MCNC: 4.8 GM/ML (ref 2.3–3.5)
GLUCOSE SERPL-MCNC: 105 MG/DL (ref 74–106)
HCT VFR BLD AUTO: 31.1 % (ref 40–51)
HGB BLD-MCNC: 9.8 GM/DL (ref 13.5–17.5)
IMM GRANULOCYTES # BLD AUTO: 0.02 10*3/UL
IMM GRANULOCYTES NFR BLD AUTO: 0 %
LYMPHOCYTES # BLD AUTO: 0.28 X10(3)/MCL
LYMPHOCYTES NFR BLD AUTO: 5 % (ref 15–40)
MCH RBC QN AUTO: 26.7 PG (ref 26–34)
MCHC RBC AUTO-ENTMCNC: 31.5 GM/DL (ref 31–37)
MCV RBC AUTO: 84.7 FL (ref 80–100)
MONOCYTES # BLD AUTO: 0.67 X10(3)/MCL
MONOCYTES NFR BLD AUTO: 12 % (ref 4–12)
NEUTROPHILS # BLD AUTO: 4.6 X10(3)/MCL
NEUTROPHILS NFR BLD AUTO: 79 X10(3)/MCL
PLATELET # BLD AUTO: 96 X10(3)/MCL (ref 130–400)
PMV BLD AUTO: 10.8 FL (ref 7.4–10.4)
POTASSIUM SERPL-SCNC: 3.2 MMOL/L (ref 3.5–5.1)
PROT SERPL-MCNC: 7.3 GM/DL (ref 6.4–8.2)
RBC # BLD AUTO: 3.67 X10(6)/MCL (ref 4.5–5.9)
SODIUM SERPL-SCNC: 136 MMOL/L (ref 136–145)
WBC # SPEC AUTO: 5.8 X10(3)/MCL (ref 4.5–11)

## 2017-10-16 ENCOUNTER — HISTORICAL (OUTPATIENT)
Dept: ADMINISTRATIVE | Facility: HOSPITAL | Age: 57
End: 2017-10-16

## 2017-10-16 LAB
ALBUMIN SERPL-MCNC: 2.6 GM/DL (ref 3.4–5)
ALBUMIN/GLOB SERPL: 1 RATIO (ref 1–2)
ALP SERPL-CCNC: 143 UNIT/L (ref 45–117)
ALT SERPL-CCNC: 13 UNIT/L (ref 12–78)
AST SERPL-CCNC: 25 UNIT/L (ref 15–37)
BILIRUB SERPL-MCNC: 0.7 MG/DL (ref 0.2–1)
BILIRUBIN DIRECT+TOT PNL SERPL-MCNC: 0.3 MG/DL
BILIRUBIN DIRECT+TOT PNL SERPL-MCNC: 0.4 MG/DL
BUN SERPL-MCNC: 6 MG/DL (ref 7–18)
CALCIUM SERPL-MCNC: 12.2 MG/DL (ref 8.5–10.1)
CHLORIDE SERPL-SCNC: 101 MMOL/L (ref 98–107)
CO2 SERPL-SCNC: 29 MMOL/L (ref 21–32)
CREAT SERPL-MCNC: 0.6 MG/DL (ref 0.6–1.3)
ERYTHROCYTE [DISTWIDTH] IN BLOOD BY AUTOMATED COUNT: 19.2 % (ref 11.5–14.5)
GLOBULIN SER-MCNC: 4.9 GM/ML (ref 2.3–3.5)
GLUCOSE SERPL-MCNC: 112 MG/DL (ref 74–106)
HCT VFR BLD AUTO: 30.6 % (ref 40–51)
HGB BLD-MCNC: 9.5 GM/DL (ref 13.5–17.5)
MCH RBC QN AUTO: 25.7 PG (ref 26–34)
MCHC RBC AUTO-ENTMCNC: 31 GM/DL (ref 31–37)
MCV RBC AUTO: 82.9 FL (ref 80–100)
PLATELET # BLD AUTO: 77 X10(3)/MCL (ref 130–400)
PMV BLD AUTO: 10.9 FL (ref 7.4–10.4)
POTASSIUM SERPL-SCNC: 2.9 MMOL/L (ref 3.5–5.1)
PROT SERPL-MCNC: 7.5 GM/DL (ref 6.4–8.2)
RBC # BLD AUTO: 3.69 X10(6)/MCL (ref 4.5–5.9)
SODIUM SERPL-SCNC: 139 MMOL/L (ref 136–145)
WBC # SPEC AUTO: 4.8 X10(3)/MCL (ref 4.5–11)

## 2017-10-18 ENCOUNTER — HISTORICAL (OUTPATIENT)
Dept: ADMINISTRATIVE | Facility: HOSPITAL | Age: 57
End: 2017-10-18

## 2017-10-18 LAB
AMPHET UR QL SCN: NEGATIVE
BARBITURATE SCN PRESENT UR: NEGATIVE
BENZODIAZ UR QL SCN: NEGATIVE
CANNABINOIDS UR QL SCN: NEGATIVE
COCAINE UR QL SCN: NEGATIVE
OPIATES UR QL SCN: POSITIVE
PCP UR QL: NEGATIVE
PH UR STRIP.AUTO: 6 [PH] (ref 5–8)
TEMPERATURE, URINE (OHS): 22.4 DEGC (ref 20–25)
TSH SERPL-ACNC: 2.12 MIU/L (ref 0.36–3.74)

## 2017-10-27 ENCOUNTER — HISTORICAL (OUTPATIENT)
Dept: ADMINISTRATIVE | Facility: HOSPITAL | Age: 57
End: 2017-10-27

## 2017-10-27 LAB
ABS NEUT (OLG): 3.76 X10(3)/MCL (ref 2.1–9.2)
ALBUMIN SERPL-MCNC: 2.4 GM/DL (ref 3.4–5)
ALBUMIN/GLOB SERPL: 0 RATIO (ref 1–2)
ALP SERPL-CCNC: 199 UNIT/L (ref 45–117)
ALT SERPL-CCNC: 15 UNIT/L (ref 12–78)
AST SERPL-CCNC: 33 UNIT/L (ref 15–37)
BASOPHILS # BLD AUTO: 0.03 X10(3)/MCL
BASOPHILS NFR BLD AUTO: 1 % (ref 0–1)
BILIRUB SERPL-MCNC: 0.8 MG/DL (ref 0.2–1)
BILIRUBIN DIRECT+TOT PNL SERPL-MCNC: 0.4 MG/DL
BILIRUBIN DIRECT+TOT PNL SERPL-MCNC: 0.4 MG/DL
BUN SERPL-MCNC: 6 MG/DL (ref 7–18)
CALCIUM SERPL-MCNC: 12.6 MG/DL (ref 8.5–10.1)
CHLORIDE SERPL-SCNC: 101 MMOL/L (ref 98–107)
CO2 SERPL-SCNC: 29 MMOL/L (ref 21–32)
CREAT SERPL-MCNC: 0.5 MG/DL (ref 0.6–1.3)
EOSINOPHIL # BLD AUTO: 0.08 X10(3)/MCL
EOSINOPHIL NFR BLD AUTO: 2 % (ref 0–5)
ERYTHROCYTE [DISTWIDTH] IN BLOOD BY AUTOMATED COUNT: 20.3 % (ref 11.5–14.5)
GLOBULIN SER-MCNC: 4.9 GM/ML (ref 2.3–3.5)
GLUCOSE SERPL-MCNC: 110 MG/DL (ref 74–106)
HCT VFR BLD AUTO: 29.5 % (ref 40–51)
HGB BLD-MCNC: 9.2 GM/DL (ref 13.5–17.5)
IMM GRANULOCYTES # BLD AUTO: 0.02 10*3/UL
IMM GRANULOCYTES NFR BLD AUTO: 0 %
LYMPHOCYTES # BLD AUTO: 0.18 X10(3)/MCL
LYMPHOCYTES NFR BLD AUTO: 4 % (ref 15–40)
MCH RBC QN AUTO: 26.1 PG (ref 26–34)
MCHC RBC AUTO-ENTMCNC: 31.2 GM/DL (ref 31–37)
MCV RBC AUTO: 83.8 FL (ref 80–100)
MONOCYTES # BLD AUTO: 0.55 X10(3)/MCL
MONOCYTES NFR BLD AUTO: 12 % (ref 4–12)
NEUTROPHILS # BLD AUTO: 3.76 X10(3)/MCL
NEUTROPHILS NFR BLD AUTO: 82 X10(3)/MCL
PLATELET # BLD AUTO: 72 X10(3)/MCL (ref 130–400)
PMV BLD AUTO: 12.1 FL (ref 7.4–10.4)
POTASSIUM SERPL-SCNC: 3.5 MMOL/L (ref 3.5–5.1)
PROT SERPL-MCNC: 7.3 GM/DL (ref 6.4–8.2)
RBC # BLD AUTO: 3.52 X10(6)/MCL (ref 4.5–5.9)
SODIUM SERPL-SCNC: 136 MMOL/L (ref 136–145)
WBC # SPEC AUTO: 4.6 X10(3)/MCL (ref 4.5–11)

## 2017-11-09 ENCOUNTER — HISTORICAL (OUTPATIENT)
Dept: ADMINISTRATIVE | Facility: HOSPITAL | Age: 57
End: 2017-11-09

## 2017-11-09 LAB
ABS NEUT (OLG): 5.02 X10(3)/MCL (ref 2.1–9.2)
ALBUMIN SERPL-MCNC: 2.4 GM/DL (ref 3.4–5)
ALBUMIN/GLOB SERPL: 0 RATIO (ref 1–2)
ALP SERPL-CCNC: 220 UNIT/L (ref 45–117)
ALT SERPL-CCNC: 16 UNIT/L (ref 12–78)
ANISOCYTOSIS BLD QL SMEAR: ABNORMAL
AST SERPL-CCNC: 44 UNIT/L (ref 15–37)
BASOPHILS NFR BLD MANUAL: 2 %
BILIRUB SERPL-MCNC: 0.8 MG/DL (ref 0.2–1)
BILIRUBIN DIRECT+TOT PNL SERPL-MCNC: 0.4 MG/DL
BILIRUBIN DIRECT+TOT PNL SERPL-MCNC: 0.4 MG/DL
BUN SERPL-MCNC: 7 MG/DL (ref 7–18)
CALCIUM SERPL-MCNC: 13.8 MG/DL (ref 8.5–10.1)
CHLORIDE SERPL-SCNC: 100 MMOL/L (ref 98–107)
CO2 SERPL-SCNC: 28 MMOL/L (ref 21–32)
CREAT SERPL-MCNC: 0.9 MG/DL (ref 0.6–1.3)
EOSINOPHIL NFR BLD MANUAL: 0 %
ERYTHROCYTE [DISTWIDTH] IN BLOOD BY AUTOMATED COUNT: 20.8 % (ref 11.5–14.5)
GLOBULIN SER-MCNC: 5.1 GM/ML (ref 2.3–3.5)
GLUCOSE SERPL-MCNC: 111 MG/DL (ref 74–106)
GRANULOCYTES NFR BLD MANUAL: 78 % (ref 43–75)
HCT VFR BLD AUTO: 30.5 % (ref 40–51)
HGB BLD-MCNC: 9.6 GM/DL (ref 13.5–17.5)
HYPOCHROMIA BLD QL SMEAR: ABNORMAL
LYMPHOCYTES NFR BLD MANUAL: 4 % (ref 20.5–51.1)
MCH RBC QN AUTO: 26.5 PG (ref 26–34)
MCHC RBC AUTO-ENTMCNC: 31.5 GM/DL (ref 31–37)
MCV RBC AUTO: 84.3 FL (ref 80–100)
MONOCYTES NFR BLD MANUAL: 8 % (ref 2–9)
NEUTS BAND NFR BLD MANUAL: 8 % (ref 0–10)
PLATELET # BLD AUTO: 90 X10(3)/MCL (ref 130–400)
PLATELET # BLD EST: ABNORMAL 10*3/UL
PMV BLD AUTO: 11.6 FL (ref 7.4–10.4)
POLYCHROMASIA BLD QL SMEAR: ABNORMAL
POTASSIUM SERPL-SCNC: 4.2 MMOL/L (ref 3.5–5.1)
PROT SERPL-MCNC: 7.5 GM/DL (ref 6.4–8.2)
RBC # BLD AUTO: 3.62 X10(6)/MCL (ref 4.5–5.9)
RBC MORPH BLD: ABNORMAL
SODIUM SERPL-SCNC: 134 MMOL/L (ref 136–145)
WBC # SPEC AUTO: 6.1 X10(3)/MCL (ref 4.5–11)

## 2022-09-13 NOTE — HISTORICAL [facility] CERNER
This is a historical note converted from [employer]. Formatting and pictures may have been removed.  Please reference [employer] for original formatting and attached multimedia. 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 Garcia, Dr. Beach  Anesthesia  MAC  Estimated Blood Loss  5 cc  Urine Output  None  Findings  None  Specimen(s)  None  Complications  None  Technique  ?The patient was taken to the operating room, placed supine, put under MAC anesthesia. The patients neck, chest, and shoulders were prepped and draped in usual sterile fashion. The left subclavian vein was accessed with a 2.5 gauge needle, cannulated and position verified with C-arm fluoroscopy. The wire was passed, which was in good position under fluoro, using Seldinger Technique. An incision was made on the left shoulder area. Near wire incision site made a pocket above the fascia and sutured in a size 7.5-[place]awk single-lumen MediPort into the pocket in?1 places using 3-0 Nylon. I then sized the catheter under fluoro and placed introducer and dilator over the wire, removed the wire and dilator, placed the catheter through the introducer and removed the introducer. The line tip was in good position under fluoro. It withdrew and flushed well. I then closed the incision using 3-0 Vicryl, 4-0 Monocryl for the skin, and dressed with Steri-Strips. Accessed the ports with a 1-inch 20-gauge Stratton needle, and it withdrew and flushed well with final heparin flush. We secured this with Tegaderm. The patient was extubated and aroused and brought to post-operative recovery.  ?   The patient was taken to the operating room, placed supine, put under MAC anesthesia. The patients neck, chest, and shoulders were prepped and draped in usual sterile fashion. The left subclavian vein was accessed with a 2.5  gauge needle, cannulated and position verified with C-arm fluoroscopy. An incision was made on the left shoulder area. Near wire incision site made a pocket above the fascia I then sized the catheter under fluoro and placed introducer and dilator over the wire, removed the wire and dilator, placed the catheter through the introducer and removed the introducer. The line tip was in good position under fluoro.?I then placed the mediport into the pocket?sutured in a size 7.5-[place] single-lumen MediPort into the pocket in 1 places using 3-0 Nylon. It withdrew and flushed well. I then closed the incision using 3-0 Vicryl, 4-0 Monocryl for the skin, and dressed with Steri-Strips. Accessed the ports with a 1-inch 20-gauge Stratton needle, and it withdrew and flushed well with final heparin flush. We secured this with Tegaderm. The patient was extubated and aroused and brought to post-operative recovery.  ?

## 2022-09-13 NOTE — HISTORICAL [facility] CERNER
This is a historical note converted from [employer]. Formatting and pictures may have been removed.  Please reference [employer] for original formatting and attached multimedia. Chief Complaint  referral mediport evaluation  History of Present Illness  56 y/o M w/ stage III SCC of supraglottis. Pt referred by oncology for mediport because he told them he is tired of getting stuck all over. Pt hx of cardiac disease. Denies lung disease apart from lung mets. He denies hx of adverse reaction to anesthesia.  Interval History  The only change the patient reports is having some new bulges appear in his scrotum bilaterally.  Review of Systems  Negative except as above.  Physical Exam  ???Vitals & Measurements  ?  Gen: NAD, comfortable  CV: RRR, no murmur  Pulm: CTAB, good air movement bilaterally  Abd: soft, NT, ND, BS+, small umbilical hernia  Ext: no edema, no gross deformity  : Bilateral scrotal swelling..possible inguinal hernias  Neuro: alert  ?   Assessment/Plan  ?   56 y/o M w/ SCC of supraglottis who would like mediport placement while undergoing palliative care.  -OR for mediport on 10/18  ?   Labs examined and physical exam performed and any changes have been updated in the note above. Problem List/Past Medical History  ??Ongoing  ??Bone cancer  ??Bone metastases  ??Cancer of head and neck  ??Chronic disease-related malnutrition  ??Cirrhosis  ??H/O laryngeal cancer  ??Hepatitis C  ??Hoarseness  ??Itching  ??Lung metastases  ??Obesity  ??Pain  ??Pancytopenia  ??Portal hypertension  ??Pustular rash  ??Smoker  ??Thrombocytopenia  ?Historical  Procedure/Surgical History  ??Bronchoscopy w/ Needle Aspir Biopsy(s) (05/03/2017)  ??Bronchoscopy, Ebus, 2 or Less Samples (05/03/2017)  ??Bronchoscopy, rigid or flexible, including fluoroscopic guidance, when performed; with endobronchial ultrasound (EBUS) guided transtracheal and/or transbronchial sampling (eg, aspiration[s]/biopsy[ies]), one or two mediastinal and/or hilar lymph  node stat (05/03/2017)  ??Excision of Thorax Lymphatic, Percutaneous Endoscopic Approach, Diagnostic (05/03/2017)  ??Esophagogastroduodenoscopy (12/19/2016)  ??Esophagogastroduodenoscopy, flexible, transoral; diagnostic, including collection of specimen(s) by brushing or washing, when performed (separate procedure) (12/19/2016)  ??Inspection of Upper Intestinal Tract, Via Natural or Artificial Opening Endoscopic (12/19/2016)  ??Colonoscopy (12/02/2016)  ??Colonoscopy, flexible; diagnostic, including collection of specimen(s) by brushing or washing, when performed (separate procedure) (12/02/2016)  ??Inspection of Lower Intestinal Tract, Via Natural or Artificial Opening Endoscopic (12/02/2016)  ??Esophagogastroduodenoscopy (05/20/2016)  ??Esophagogastroduodenoscopy, flexible, transoral; diagnostic, including collection of specimen(s) by brushing or washing, when performed (separate procedure) (05/20/2016)  ??Inspection of Upper Intestinal Tract, Via Natural or Artificial Opening Endoscopic (05/20/2016)  ??bone marrow bx (10/10/2015)  ??Bronchoscopy, Diagnostic (08/24/2015)  ??Bronchoscopy, rigid or flexible, including fluoroscopic guidance, when performed; diagnostic, with cell washing, when performed (separate procedure) (08/24/2015)  ??Closed [endoscopic] biopsy of larynx (08/24/2015)  ??Esophagoscopy (None) (08/24/2015)  ??Esophagoscopy, flexible, transoral; diagnostic, including collection of specimen(s) by brushing or washing, when performed (separate procedure) (08/24/2015)  ??Fiber-optic bronchoscopy (08/24/2015)  ??Laryngoscopy (None) (08/24/2015)  ??Laryngoscopy, direct, operative, with biopsy; (08/24/2015)  ??Other esophagoscopy (08/24/2015)  ??FOOT SURGERY  ??JAW SURGERY  Medications  ??acetaminophen 325 mg oral tablet, 650 mg= 2 tab(s), Oral, Once-chemo  ??AMOXICILLIN 500MG CAP, 500 mg= 1 cap(s), Oral, TID,? ?Not taking  ??Benadryl (for IVPB)  ??cetuximab (for IVPB), 430 mg= 215 mL, 200 mg/m2, IV,  Once  ??diphenhydrAMINE 25 mg oral capsule, 50 mg= 2 cap(s), Oral, Once  ??Heparin Flush 100 U/mL - 5 mL, 500 units= 5 mL, IV Push, Once-chemo  ??Heparin Flush 100 U/mL - 5 mL, 500 units= 5 mL, IV Push, Once-chemo  ??Heparin Flush 100 U/mL - 5 mL, 500 units= 5 mL, IV Push, Once-chemo  ??Heparin Flush 100 U/mL - 5 mL, 500 units= 5 mL, IV Push, Once-chemo  ??Heparin Flush 100 U/mL - 5 mL, 500 units= 5 mL, IV Push, Once-chemo  ??Heparin Flush 100 U/mL - 5 mL, 500 units= 5 mL, IV Push, Once-chemo  ??Heparin Flush 100 U/mL - 5 mL, 500 units= 5 mL, IV Push, Once-chemo  ??Heparin Flush 100 U/mL - 5 mL, 500 units= 5 mL, IV Push, Once-chemo  ??Heparin Flush 100 U/mL - 5 mL, 500 units= 5 mL, IV Push, Once-chemo  ??Heparin Flush 100 U/mL - 5 mL, 500 units= 5 mL, IV Push, Once-chemo  ??Heparin Flush 100 U/mL - 5 mL, 500 units= 5 mL, IV Push, Once-chemo  ??Heparin Flush 100 U/mL - 5 mL, 500 units= 5 mL, IV Push, Once-chemo  ??Heparin Flush 100 U/mL - 5 mL, 500 units= 5 mL, IV Push, Once-chemo  ??Heparin Flush 100 U/mL - 5 mL, 500 units= 5 mL, IV Push, Once-chemo  ??Heparin Flush 100 U/mL - 5 mL, 500 units= 5 mL, IV Push, Once-chemo  ??Heparin Flush 100 U/mL - 5 mL, 500 units= 5 mL, IV Push, Once-chemo  ??Heparin Flush 100 U/mL - 5 mL, 500 units= 5 mL, IV Push, Once-chemo  ??Heparin Flush 100 U/mL - 5 mL, 500 units= 5 mL, IV Push, Once-chemo  ??Heparin Flush 100 U/mL - 5 mL, 500 units= 5 mL, IV Push, Once-chemo  ??Heparin Flush 100 U/mL - 5 mL, 500 units= 5 mL, IV Push, Once-chemo  ??Heparin Flush 100 U/mL - 5 mL, 500 units= 5 mL, IV Push, Once-chemo  ??Heparin Flush 100 U/mL - 5 mL, 500 units= 5 mL, IV Push, Once-chemo  ??Heparin Flush 100 U/mL - 5 mL, 500 units= 5 mL, IV Push, Once-chemo  ??Heparin Flush 100 U/mL - 5 mL, 500 units= 5 mL, IV Push, Once-chemo  ??Heparin Flush 100 U/mL - 5 mL, 500 units= 5 mL, IV Push, Once-chemo  ??Heparin Flush 100 U/mL - 5 mL, 500 units= 5 mL, IV Push, Once-chemo  ??Heparin Flush 100 U/mL - 5  mL, 500 units= 5 mL, IV Push, Once-chemo  ??Heparin Flush 100 U/mL - 5 mL, 500 units= 5 mL, IV Push, Once-chemo  ??Infed, 25 mg= 0.5 mL, IV Push, Once-chemo  ??INFeD (for IVPB)  ??LEVOFLOXACIN 500MG TAB, 500 mg= 1 tab(s), Oral, Daily,? ?Not taking  ??levothyroxine 150 mcg (0.15 mg) oral tablet, 150 mcg= 1 tab(s), Oral, Daily, 2 refills  ??LEVOTHYROXINE SODIUM 25MCG TAB, 25 mcg= 1 tab(s), Oral, Daily,? ?Not Taking per Prescriber  ??Megace 40 mg/mL oral suspension, 800 mg= 20 mL, Oral, Daily, 2 refills  ??morphine SULFATE ER 15MG TER, 15 mg= 1 tab(s), Oral, BID,? ?Not taking: duplicate  ??MS Contin 15 mg oral tablet, extended release, 1 tab(s), Oral, q12hr  ??Neulasta 6mg/0.6ml delivery kit, 6 mg= 0.6 mL, Subcutaneous, Once-chemo  ??Norco 10 mg-325 mg oral tablet, 1 tab(s), Oral, q4hr, PRN  ??ONDANSETRON HCL 8 MG TABLET, Oral  ??Opdivo  ??PEPCID 20MG TAB,? ?Not taking  ??PROMETH/CODEINE 6.25-10MG/5ML LIQ, Oral, q6hr,? ?Not taking  ??Protonix 40 mg ORAL enteric coated tablet, 40 mg= 1 tab(s), Oral, Daily, 1 refills,? ?Not taking  ??Solu-Medrol 40 mg (for IVPB)  ??Tylenol, 650 mg= 2 tab(s), Oral, Once-chemo  ??Zofran 4 mg oral tablet, 4 mg= 1 tab(s), Oral, q6hr, PRN, 1 refills  ??Zometa  ??Zometa  ??Zometa  Allergies  No Known Medication Allergies  Social History  ??Alcohol - 06/29/2017  ???Past  ??Past, Beer, Started age 6 Years. Stopped age 51 Years. Alcohol use interferes with work or home: No. Drinks more than intended: No. Others hurt by drinking: No.  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 Highest education level: Some college.  ??Home/Environment - 06/29/2017  ???Lives with Alone. Living situation: Home/Independent. Alcohol abuse in household: No. Substance abuse in household: No. Smoker in household: Yes. Injuries/Abuse/Neglect in household: No. Feels unsafe at home: No. Safe place to go: Yes. Family/Friends available for support: Yes.  ??Nutrition/Health -  06/29/2017  ???Regular  ??Sexual - 06/29/2017  ???Sexually active: No.  ??Substance Abuse - 06/29/2017  ???Past, Amphetamines, Cocaine, Ecstasy, Hallucinogens/LSD, Heroin, Marijuana, Methamphetamines, Previous treatment: None. Started age 13 Years. Stopped age 35 Years. IV drug use: Yes. Drug use interferes with work/home: Yes. Ready to change: Yes.  ??Tobacco - 06/29/2017  ???Former smoker  ??Former smoker, Cigarettes  Family History  ??Acute myocardial infarction.: Brother.  ??COPD (chronic obstructive pulmonary disease).: Mother.  ??Cataract.: Mother and Father.  ??Cystic kidney disease.: Mother.  ??Diabetes mellitus type 2: Grandfather.  ??Heart disease.: Mother.  ??Hepatitis B.: Mother.  ??Hypertension.: Mother and Brother.  ??Secondary cancer of bone: Brother.    As above, seen and examined. To OR for mediport.